# Patient Record
Sex: FEMALE | Race: BLACK OR AFRICAN AMERICAN | NOT HISPANIC OR LATINO | URBAN - METROPOLITAN AREA
[De-identification: names, ages, dates, MRNs, and addresses within clinical notes are randomized per-mention and may not be internally consistent; named-entity substitution may affect disease eponyms.]

---

## 2024-04-05 ENCOUNTER — APPOINTMENT (RX ONLY)
Dept: URBAN - METROPOLITAN AREA CLINIC 166 | Facility: CLINIC | Age: 29
Setting detail: DERMATOLOGY
End: 2024-04-05

## 2024-04-05 DIAGNOSIS — L20.89 OTHER ATOPIC DERMATITIS: ICD-10-CM | Status: INADEQUATELY CONTROLLED

## 2024-04-05 PROCEDURE — 99204 OFFICE O/P NEW MOD 45 MIN: CPT

## 2024-04-05 PROCEDURE — ? COUNSELING

## 2024-04-05 PROCEDURE — ? PRESCRIPTION

## 2024-04-05 PROCEDURE — ? PRESCRIPTION MEDICATION MANAGEMENT

## 2024-04-05 RX ORDER — CLOBETASOL PROPIONATE 0.5 MG/G
CREAM TOPICAL
Qty: 60 | Refills: 2 | Status: ERX | COMMUNITY
Start: 2024-04-05

## 2024-04-05 RX ADMIN — CLOBETASOL PROPIONATE: 0.5 CREAM TOPICAL at 00:00

## 2024-04-05 ASSESSMENT — LOCATION SIMPLE DESCRIPTION DERM
LOCATION SIMPLE: LEFT HAND
LOCATION SIMPLE: RIGHT HAND

## 2024-04-05 ASSESSMENT — LOCATION DETAILED DESCRIPTION DERM
LOCATION DETAILED: RIGHT RADIAL DORSAL HAND
LOCATION DETAILED: LEFT RADIAL DORSAL HAND

## 2024-04-05 ASSESSMENT — SEVERITY ASSESSMENT 2020: SEVERITY 2020: MILD

## 2024-04-05 ASSESSMENT — BSA ECZEMA: % BODY COVERED IN ECZEMA: 6

## 2024-04-05 ASSESSMENT — LOCATION ZONE DERM: LOCATION ZONE: HAND

## 2024-04-05 NOTE — PROCEDURE: COUNSELING
Moisturizer Recommendations: Vaseline/Aquaphor Daily \\nAveeno Eczema Relief
Detail Level: Zone
Cleanser Recommendations: Dove Sensitive
Antihistamine Recommendations: Allegra or Zyrtec Daily

## 2024-04-05 NOTE — PROCEDURE: PRESCRIPTION MEDICATION MANAGEMENT
Render In Strict Bullet Format?: No
Plan: -F/U in 8 weeks \\n-Patient works at a  and washes her hands constantly throughout the day. Encouraged to limit hand washing as much as possible and moisturize with vaseline after each handwashing
Detail Level: Zone
Initiate Treatment: Clobetasol cream twice daily for 3 wks on 1 wk off, repeat as needed.\\nCover with vaseline.\\nWash hands with Dove Sensitive.

## 2024-05-31 ENCOUNTER — APPOINTMENT (RX ONLY)
Dept: URBAN - METROPOLITAN AREA CLINIC 166 | Facility: CLINIC | Age: 29
Setting detail: DERMATOLOGY
End: 2024-05-31

## 2024-05-31 DIAGNOSIS — L20.89 OTHER ATOPIC DERMATITIS: ICD-10-CM | Status: IMPROVED

## 2024-05-31 PROCEDURE — 99213 OFFICE O/P EST LOW 20 MIN: CPT

## 2024-05-31 PROCEDURE — ? PRESCRIPTION MEDICATION MANAGEMENT

## 2024-05-31 PROCEDURE — ? COUNSELING

## 2024-05-31 ASSESSMENT — LOCATION DETAILED DESCRIPTION DERM
LOCATION DETAILED: RIGHT RADIAL DORSAL HAND
LOCATION DETAILED: LEFT RADIAL DORSAL HAND

## 2024-05-31 ASSESSMENT — BSA ECZEMA: % BODY COVERED IN ECZEMA: 4

## 2024-05-31 ASSESSMENT — LOCATION SIMPLE DESCRIPTION DERM
LOCATION SIMPLE: RIGHT HAND
LOCATION SIMPLE: LEFT HAND

## 2024-05-31 ASSESSMENT — SEVERITY ASSESSMENT 2020: SEVERITY 2020: ALMOST CLEAR

## 2024-05-31 ASSESSMENT — LOCATION ZONE DERM: LOCATION ZONE: HAND

## 2024-05-31 NOTE — PROCEDURE: PRESCRIPTION MEDICATION MANAGEMENT
Render In Strict Bullet Format?: No
Plan: Patient satisfied with results \\nIf no improvement in flares, we will consider Dupixent in the future\\nF/U in 1 year for refills or sooner with flared
Continue Regimen: Clobetasol cream twice daily for 3 wks on 1 wk off, repeat as needed.\\nCover with vaseline prn throughout the day and before bed \\nWash hands with Dove Sensitive.
Detail Level: Zone

## 2025-05-30 ENCOUNTER — APPOINTMENT (OUTPATIENT)
Dept: URBAN - METROPOLITAN AREA CLINIC 166 | Facility: CLINIC | Age: 30
Setting detail: DERMATOLOGY
End: 2025-05-30

## 2025-05-30 DIAGNOSIS — L65.8 OTHER SPECIFIED NONSCARRING HAIR LOSS: ICD-10-CM

## 2025-05-30 DIAGNOSIS — L20.89 OTHER ATOPIC DERMATITIS: ICD-10-CM | Status: WELL CONTROLLED

## 2025-05-30 PROCEDURE — 99213 OFFICE O/P EST LOW 20 MIN: CPT

## 2025-05-30 PROCEDURE — ? PRESCRIPTION MEDICATION MANAGEMENT

## 2025-05-30 PROCEDURE — ? COUNSELING

## 2025-05-30 ASSESSMENT — SEVERITY ASSESSMENT 2020: SEVERITY 2020: CLEAR

## 2025-05-30 ASSESSMENT — LOCATION SIMPLE DESCRIPTION DERM
LOCATION SIMPLE: RIGHT HAND
LOCATION SIMPLE: LEFT HAND
LOCATION SIMPLE: SCALP
LOCATION SIMPLE: LEFT HAND
LOCATION SIMPLE: RIGHT HAND

## 2025-05-30 ASSESSMENT — LOCATION ZONE DERM
LOCATION ZONE: HAND
LOCATION ZONE: SCALP
LOCATION ZONE: HAND

## 2025-05-30 ASSESSMENT — LOCATION DETAILED DESCRIPTION DERM
LOCATION DETAILED: LEFT RADIAL DORSAL HAND
LOCATION DETAILED: LEFT ULNAR DORSAL HAND
LOCATION DETAILED: RIGHT CENTRAL FRONTAL SCALP
LOCATION DETAILED: RIGHT RADIAL DORSAL HAND
LOCATION DETAILED: RIGHT ULNAR DORSAL HAND

## 2025-05-30 ASSESSMENT — SEVERITY OF ALOPECIA TOOL: % SCALP HAIR LOST: 2

## 2025-05-30 ASSESSMENT — SEVERITY ASSESSMENT OVERALL AMONG ALL PATIENTS
IN YOUR EXPERIENCE, AMONG ALL PATIENTS YOU HAVE SEEN WITH THIS CONDITION, HOW SEVERE IS THIS PATIENT'S CONDITION?: S1 (LESS THAN 25% HAIR LOSS)

## 2025-05-30 ASSESSMENT — BSA ECZEMA: % BODY COVERED IN ECZEMA: 0

## 2025-05-30 ASSESSMENT — ITCH NUMERIC RATING SCALE: HOW SEVERE IS YOUR ITCHING?: 0

## 2025-05-30 NOTE — PROCEDURE: COUNSELING
Moisturizer Recommendations: Vaseline/Aquaphor Daily \\nAveeno Eczema Relief
Detail Level: Zone
Cleanser Recommendations: Dove Sensitive
Antihistamine Recommendations: Allegra or Zyrtec Daily
Detail Level: Detailed

## 2025-05-30 NOTE — PROCEDURE: PRESCRIPTION MEDICATION MANAGEMENT
Detail Level: Zone
Continue Regimen: Clobetasol cream 
Render In Strict Bullet Format?: No
Plan: -continue clobetasol cream BID for up to 2 weeks at a time when flared